# Patient Record
Sex: MALE | Race: WHITE | NOT HISPANIC OR LATINO | Employment: OTHER | URBAN - METROPOLITAN AREA
[De-identification: names, ages, dates, MRNs, and addresses within clinical notes are randomized per-mention and may not be internally consistent; named-entity substitution may affect disease eponyms.]

---

## 2018-05-24 ENCOUNTER — CONVERSION ENCOUNTER (OUTPATIENT)
Dept: NEUROLOGY | Facility: CLINIC | Age: 43
End: 2018-05-24

## 2019-08-08 ENCOUNTER — HOSPITAL ENCOUNTER (OUTPATIENT)
Dept: URGENT CARE | Facility: CLINIC | Age: 44
Discharge: HOME | End: 2019-08-08

## 2019-08-15 ENCOUNTER — OFFICE VISIT CONVERTED (OUTPATIENT)
Dept: ORTHOPEDIC SURGERY | Facility: CLINIC | Age: 44
End: 2019-08-15
Attending: ORTHOPAEDIC SURGERY

## 2019-08-15 ENCOUNTER — CONVERSION ENCOUNTER (OUTPATIENT)
Dept: ORTHOPEDIC SURGERY | Facility: CLINIC | Age: 44
End: 2019-08-15

## 2019-08-22 ENCOUNTER — HOSPITAL ENCOUNTER (OUTPATIENT)
Dept: MRI IMAGING | Facility: HOSPITAL | Age: 44
Discharge: HOME | End: 2019-08-22
Attending: ORTHOPAEDIC SURGERY

## 2019-09-03 ENCOUNTER — OFFICE VISIT CONVERTED (OUTPATIENT)
Dept: ORTHOPEDIC SURGERY | Facility: CLINIC | Age: 44
End: 2019-09-03
Attending: ORTHOPAEDIC SURGERY

## 2019-09-16 ENCOUNTER — HOSPITAL ENCOUNTER (OUTPATIENT)
Dept: PERIOP | Facility: HOSPITAL | Age: 44
Setting detail: HOSPITAL OUTPATIENT SURGERY
Discharge: HOME | End: 2019-09-16
Attending: ORTHOPAEDIC SURGERY

## 2019-09-19 ENCOUNTER — HOSPITAL ENCOUNTER (OUTPATIENT)
Dept: PHYSICAL THERAPY | Facility: CLINIC | Age: 44
Setting detail: RECURRING SERIES
Discharge: HOME | End: 2019-10-17
Attending: ORTHOPAEDIC SURGERY

## 2019-10-01 ENCOUNTER — OFFICE VISIT CONVERTED (OUTPATIENT)
Dept: ORTHOPEDIC SURGERY | Facility: CLINIC | Age: 44
End: 2019-10-01
Attending: ORTHOPAEDIC SURGERY

## 2019-10-17 ENCOUNTER — OFFICE VISIT CONVERTED (OUTPATIENT)
Dept: ORTHOPEDIC SURGERY | Facility: CLINIC | Age: 44
End: 2019-10-17
Attending: ORTHOPAEDIC SURGERY

## 2020-07-29 ENCOUNTER — HOSPITAL ENCOUNTER (OUTPATIENT)
Dept: URGENT CARE | Facility: CLINIC | Age: 45
Discharge: HOME | End: 2020-07-29
Attending: FAMILY MEDICINE

## 2020-09-12 ENCOUNTER — HOSPITAL ENCOUNTER (OUTPATIENT)
Dept: URGENT CARE | Facility: CLINIC | Age: 45
Discharge: HOME | End: 2020-09-12
Attending: EMERGENCY MEDICINE

## 2021-04-23 ENCOUNTER — HOSPITAL ENCOUNTER (OUTPATIENT)
Dept: GENERAL RADIOLOGY | Facility: HOSPITAL | Age: 46
Discharge: HOME | End: 2021-04-23

## 2021-04-28 ENCOUNTER — OFFICE VISIT CONVERTED (OUTPATIENT)
Dept: CARDIOLOGY | Facility: CLINIC | Age: 46
End: 2021-04-28
Attending: INTERNAL MEDICINE

## 2021-04-28 ENCOUNTER — CONVERSION ENCOUNTER (OUTPATIENT)
Dept: CARDIOLOGY | Facility: CLINIC | Age: 46
End: 2021-04-28

## 2021-05-14 VITALS
SYSTOLIC BLOOD PRESSURE: 136 MMHG | HEIGHT: 70 IN | HEART RATE: 84 BPM | DIASTOLIC BLOOD PRESSURE: 86 MMHG | WEIGHT: 188 LBS | BODY MASS INDEX: 26.92 KG/M2

## 2021-05-15 VITALS — HEART RATE: 96 BPM | OXYGEN SATURATION: 94 % | HEIGHT: 70 IN

## 2021-05-15 VITALS — HEART RATE: 85 BPM | HEIGHT: 70 IN | OXYGEN SATURATION: 92 %

## 2021-05-15 VITALS — OXYGEN SATURATION: 97 % | HEIGHT: 70 IN | HEART RATE: 95 BPM

## 2021-05-15 VITALS — HEIGHT: 70 IN | HEART RATE: 108 BPM | OXYGEN SATURATION: 94 %

## 2021-05-16 VITALS
SYSTOLIC BLOOD PRESSURE: 127 MMHG | HEIGHT: 70 IN | DIASTOLIC BLOOD PRESSURE: 87 MMHG | BODY MASS INDEX: 27.95 KG/M2 | WEIGHT: 195.25 LBS

## 2021-05-25 ENCOUNTER — TRANSCRIBE ORDERS (OUTPATIENT)
Dept: CARDIOLOGY | Facility: CLINIC | Age: 46
End: 2021-05-25

## 2021-05-25 DIAGNOSIS — R07.89 CHEST PAIN, ATYPICAL: Primary | ICD-10-CM

## 2021-06-16 ENCOUNTER — HOSPITAL ENCOUNTER (OUTPATIENT)
Dept: CT IMAGING | Facility: HOSPITAL | Age: 46
Discharge: HOME | End: 2021-06-16
Admitting: INTERNAL MEDICINE

## 2021-06-16 VITALS
RESPIRATION RATE: 16 BRPM | DIASTOLIC BLOOD PRESSURE: 81 MMHG | OXYGEN SATURATION: 98 % | HEIGHT: 70 IN | HEART RATE: 62 BPM | SYSTOLIC BLOOD PRESSURE: 128 MMHG | BODY MASS INDEX: 29.35 KG/M2 | WEIGHT: 205 LBS

## 2021-06-16 DIAGNOSIS — R07.89 CHEST PAIN, ATYPICAL: ICD-10-CM

## 2021-06-16 LAB
CREAT BLDA-MCNC: 1.3 MG/DL (ref 0.6–1.3)
QT INTERVAL: 399 MS

## 2021-06-16 PROCEDURE — 0 IOPAMIDOL PER 1 ML: Performed by: INTERNAL MEDICINE

## 2021-06-16 PROCEDURE — 93010 ELECTROCARDIOGRAM REPORT: CPT | Performed by: INTERNAL MEDICINE

## 2021-06-16 PROCEDURE — 82565 ASSAY OF CREATININE: CPT

## 2021-06-16 PROCEDURE — 93005 ELECTROCARDIOGRAM TRACING: CPT | Performed by: INTERNAL MEDICINE

## 2021-06-16 PROCEDURE — 75574 CT ANGIO HRT W/3D IMAGE: CPT

## 2021-06-16 PROCEDURE — 75574 CT ANGIO HRT W/3D IMAGE: CPT | Performed by: INTERNAL MEDICINE

## 2021-06-16 RX ORDER — IBUPROFEN 200 MG
400 TABLET ORAL EVERY 6 HOURS PRN
COMMUNITY

## 2021-06-16 RX ORDER — LISINOPRIL 10 MG/1
10 TABLET ORAL DAILY
COMMUNITY

## 2021-06-16 RX ADMIN — IOPAMIDOL 100 ML: 755 INJECTION, SOLUTION INTRAVENOUS at 14:44

## 2021-06-16 NOTE — NURSING NOTE
Reviewed with patient the D/C instructions to drink plenty of fluids today and tomorrow .  He voices understanding and is able to teach back D/C instructions.

## 2021-06-16 NOTE — NURSING NOTE
Patient is in x ray triage for CTA angiogram heart with 3 d image.  Patiemt is wearing a mask and RN is wearing a mask and goggles with each patient encounter.

## 2021-06-16 NOTE — NURSING NOTE
Fransisca from [facility] called and said that Dr. Arreola would be reading this scan.CT notified.

## 2021-06-16 NOTE — DISCHARGE INSTRUCTIONS
Drink plenty of fluids today and tomorrow to protect your kidneys.  The contrast is eliminated through the kidneys.

## 2021-06-17 ENCOUNTER — TELEPHONE (OUTPATIENT)
Dept: CARDIOLOGY | Facility: CLINIC | Age: 46
End: 2021-06-17

## 2021-06-17 ENCOUNTER — DOCUMENTATION (OUTPATIENT)
Dept: CARDIOLOGY | Facility: CLINIC | Age: 46
End: 2021-06-17

## 2021-06-17 NOTE — TELEPHONE ENCOUNTER
----- Message from MISTI Montez sent at 6/17/2021  1:36 PM EDT -----  Notify patient normal CTA heart, score is 0.  No follow up needed unless patient wants to be seen yearly.

## 2021-06-17 NOTE — PROGRESS NOTES
Cardiac CTA with morphology  6/16/21  Reason for the exam: chest pain    Calcium score is 0 Agatston units.  This is between the 1-25 percentile for men between the ages of 40-44 years old.    Heart rate 59 bpm.  Left ventricular end-diastolic volume 142 mL.  Left ventricular end-systolic volume 69 mL.  Ejection fraction 51%.  Stroke volume 73 mL.  Cardiac output 4.3 L/m    The right atrium is normal in size.  The right ventricle is normal in size.  There is grossly normal right ventricular systolic function.  The pulmonary artery is normal in size.  There are 4 pulmonary veins which enter the left atrium in their expected location.  The left atrial appendage was visualized and is without thrombus.  The intra-atrial septum appeared to be intact.  The left ventricle is normal in size with normal systolic function.  There was no evidence of a left ventricular thrombus.  The intraventricular septum appeared to be intact.  The mitral valve appeared structurally normal.  The aortic valve was trileaflet and appears structurally and functionally normal.  The pulmonic valve appeared structurally normal.  The tricuspid valve appeared structurally normal.  There was no pericardial effusion.  The pericardium appeared normal.    The left main coronary artery came off the left coronary cusp in its anticipated location.  It bifurcated into the left anterior descending artery and the circumflex coronary artery.  There was no evidence of atherosclerotic disease of the left main coronary artery.  Left anterior descending artery wraps around the apex of the heart.  There is no evidence of atherosclerotic disease.  The circumflex artery is the nondominant vessel with no evidence of atherosclerotic disease.  The right coronary artery is the dominant vessel with no evidence of atherosclerotic disease.    Conclusions:  1.  Calcium score is 0 Agatston units.  This is between the 1-25 percentile for men between the ages of 40-44 years  old.  2.  Structurally normal heart.  3.  No evidence of coronary artery disease.    Serena Arreola MD  06/17/21   74

## 2021-07-02 ENCOUNTER — APPOINTMENT (OUTPATIENT)
Dept: GENERAL RADIOLOGY | Facility: HOSPITAL | Age: 46
End: 2021-07-02

## 2021-07-02 VITALS
WEIGHT: 194.89 LBS | DIASTOLIC BLOOD PRESSURE: 79 MMHG | OXYGEN SATURATION: 97 % | TEMPERATURE: 98.2 F | BODY MASS INDEX: 27.9 KG/M2 | RESPIRATION RATE: 18 BRPM | HEIGHT: 70 IN | HEART RATE: 92 BPM | SYSTOLIC BLOOD PRESSURE: 160 MMHG

## 2021-07-02 PROCEDURE — 99282 EMERGENCY DEPT VISIT SF MDM: CPT

## 2021-07-02 PROCEDURE — 73130 X-RAY EXAM OF HAND: CPT

## 2021-07-03 ENCOUNTER — HOSPITAL ENCOUNTER (EMERGENCY)
Facility: HOSPITAL | Age: 46
Discharge: HOME | End: 2021-07-03
Attending: EMERGENCY MEDICINE | Admitting: EMERGENCY MEDICINE

## 2021-07-03 DIAGNOSIS — S61.210A LACERATION OF RIGHT INDEX FINGER WITHOUT FOREIGN BODY WITHOUT DAMAGE TO NAIL, INITIAL ENCOUNTER: Primary | ICD-10-CM

## 2021-07-03 RX ORDER — LIDOCAINE HYDROCHLORIDE 10 MG/ML
10 INJECTION, SOLUTION EPIDURAL; INFILTRATION; INTRACAUDAL; PERINEURAL ONCE
Status: COMPLETED | OUTPATIENT
Start: 2021-07-03 | End: 2021-07-03

## 2021-07-03 RX ORDER — LIDOCAINE HYDROCHLORIDE AND EPINEPHRINE 10; 10 MG/ML; UG/ML
10 INJECTION, SOLUTION INFILTRATION; PERINEURAL ONCE
Status: DISCONTINUED | OUTPATIENT
Start: 2021-07-03 | End: 2021-07-03

## 2021-07-03 RX ADMIN — LIDOCAINE HYDROCHLORIDE 10 ML: 10 INJECTION, SOLUTION EPIDURAL; INFILTRATION; INTRACAUDAL; PERINEURAL at 01:20

## 2021-07-03 NOTE — ED PROVIDER NOTES
Subjective   Patient states he was changing a tire when he cut his right index finger on an unknown object      History provided by:  Patient   used: No    Finger Laceration  Location:  Left index  Quality:  Throbbing  Severity:  Mild  Onset quality:  Sudden  Timing:  Constant  Progression:  Improving  Chronicity:  New  Associated symptoms: no abdominal pain, no chest pain, no congestion, no cough, no diarrhea, no ear pain, no fever, no headaches, no nausea, no shortness of breath, no sore throat and no vomiting        Review of Systems   Constitutional: Negative for chills and fever.   HENT: Negative for congestion, ear pain and sore throat.    Eyes: Negative for pain.   Respiratory: Negative for cough, chest tightness and shortness of breath.    Cardiovascular: Negative for chest pain.   Gastrointestinal: Negative for abdominal pain, diarrhea, nausea and vomiting.   Genitourinary: Negative for flank pain and hematuria.   Musculoskeletal: Negative for joint swelling.   Skin: Positive for wound. Negative for pallor.   Neurological: Negative for seizures and headaches.   All other systems reviewed and are negative.      Past Medical History:   Diagnosis Date   • Arthritis    • Chest pain    • GERD (gastroesophageal reflux disease)    • Hypertension        Allergies   Allergen Reactions   • Other Swelling     oranges   • Amoxicillin Unknown - Low Severity       Past Surgical History:   Procedure Laterality Date   • CHOLECYSTECTOMY     • KNEE ARTHROSCOPY      right knee scope and left torn meniscus       History reviewed. No pertinent family history.    Social History     Socioeconomic History   • Marital status: Single     Spouse name: Not on file   • Number of children: Not on file   • Years of education: Not on file   • Highest education level: Not on file   Tobacco Use   • Smoking status: Never Smoker   • Smokeless tobacco: Current User     Types: Chew   Vaping Use   • Vaping Use: Never used    Substance and Sexual Activity   • Drug use: Never           Objective   Physical Exam  Vitals and nursing note reviewed.   Constitutional:       General: He is not in acute distress.     Appearance: Normal appearance. He is not toxic-appearing.   Cardiovascular:      Rate and Rhythm: Normal rate and regular rhythm.      Pulses: Normal pulses.      Heart sounds: Normal heart sounds.   Pulmonary:      Effort: Pulmonary effort is normal. No respiratory distress.      Breath sounds: Normal breath sounds.   Abdominal:      Tenderness: There is no abdominal tenderness.   Musculoskeletal:         General: Normal range of motion.      Cervical back: Normal range of motion and neck supple.   Skin:     General: Skin is warm and dry.             Comments: 1.5 cm curved flap laceration on right index finger   Neurological:      Mental Status: He is alert and oriented to person, place, and time. Mental status is at baseline.         Laceration Repair    Date/Time: 7/3/2021 1:35 AM  Performed by: Radha Velasco APRN  Authorized by: Damaso Miller MD     Consent:     Consent obtained:  Verbal    Consent given by:  Patient    Risks discussed:  Infection, need for additional repair, pain, poor cosmetic result and poor wound healing    Alternatives discussed:  No treatment, observation and delayed treatment  Anesthesia (see MAR for exact dosages):     Anesthesia method:  Local infiltration    Local anesthetic:  Lidocaine 1% w/o epi  Laceration details:     Location:  Finger    Length (cm):  1.5    Depth (mm):  2  Repair type:     Repair type:  Simple  Exploration:     Hemostasis achieved with:  Direct pressure    Wound exploration: entire depth of wound probed and visualized      Contaminated: no    Treatment:     Area cleansed with:  Saline    Amount of cleaning:  Standard  Skin repair:     Repair method:  Sutures    Suture size:  5-0    Suture material:  Nylon    Suture technique:  Simple interrupted    Number of sutures:   2  Approximation:     Approximation:  Close  Post-procedure details:     Dressing:  Adhesive bandage    Patient tolerance of procedure:  Tolerated well, no immediate complications               ED Course                                           MDM  Number of Diagnoses or Management Options     Amount and/or Complexity of Data Reviewed  Tests in the radiology section of CPT®: reviewed        Final diagnoses:   Laceration of right index finger without foreign body without damage to nail, initial encounter       ED Disposition  ED Disposition     ED Disposition Condition Comment    Discharge Stable           Provider, No Known  [place] [id number]  [phone]    In 1 week  For suture removal         Medication List      No changes were made to your prescriptions during this visit.          Radha Velasco, APRN  07/03/21 0142

## 2021-07-03 NOTE — ED TRIAGE NOTES
Pt to ED 14 with c/o R index finger lac. Pt reports was changing a tire and sustained lac on part of the tire. Pt reports last tetanus was 2017. Pt presents a/ox4, gcs 15, bleeding controlled, approx 2cm crescent shaped lac to pad of index finger.

## 2021-07-03 NOTE — DISCHARGE INSTRUCTIONS
Keep the wound clean and dry.  Protect it from reinjury or contamination to avoid complications such as infection.  Monitor for signs of infection to include increased warmth, increased pain, or pus draining from the wound.  You may take Tylenol or Motrin over-the-counter for your pain.

## 2022-03-17 ENCOUNTER — TELEPHONE (OUTPATIENT)
Dept: NEUROLOGY | Facility: OTHER | Age: 47
End: 2022-03-17

## 2022-03-17 NOTE — TELEPHONE ENCOUNTER
PT CALLED TO SEE ABOUT GETTING SEEN FOR MIGRAINES. PT LAST SAW DR COLON IN 2017.    PT HAD CT SCAN DONE IN 2017 AT [facility]. PT HASN'T HAD ANY OTHER IMAGING DONE ON THE HEAD.    2021 PT WAS SEEN AT [facility] IN ER FOR MIGRAINE.    GAVE PT HUB FAX NUMBER.

## 2022-12-29 NOTE — PROGRESS NOTES
"   Progress Note      Patient Name: Gibran Andrade   Patient ID: [id number]   Sex: Male   YOB: 1975    Primary Care Provider: Urvashi THOMASON   Referring Provider: Damaso Haro MD    Visit Date: April 28, 2021    Provider: Antoine Kaur MD   Location: [facility]   Location Address: [address] [place] [address], [facility]   [place]  [id number]   Location Phone: [phone]          Chief Complaint     Chest pain.       History Of Present Illness  REFERRING CARE PROVIDER: Damaso Haro MD   Gibran Andrade is a 45 year old /White male with a history of hypertension, who has had chest discomfort for about the last six weeks. He was seen in the Emergency Room and ruled out for an acute myocardial infarction. He underwent stress testing at that time which, imaging-wise, showed no ischemic changes, but he has had persistent chest discomfort described as constant, stabbing in intensity, worsening at times, waxing and waning in intensity, with some left arm tingling. No other associated symptoms. He does report some shortness of breath with exertion issues as well.   PAST MEDICAL HISTORY: Chest pain; Migraine headache, Lower extremity nerve injury.   PSYCHOSOCIAL HISTORY: Rarely consumes alcohol. Previously smoked, but quit.   CURRENT MEDICATIONS: Medications have been reviewed and are as stated.      ALLERGIES:  No known drug allergies.       Review of Systems  · Cardiovascular  o Admits  o : palpitations (fast, fluttering, or skipping beats), swelling (feet, ankles, hands), shortness of breath while walking or lying flat, chest pain or angina pectoris   · Respiratory  o Denies  o : chronic or frequent cough      Vitals  Date Time BP Position Site L\R Cuff Size HR RR TEMP (F) WT  HT  BMI kg/m2 BSA m2 O2 Sat FR L/min FiO2 HC       04/28/2021 01:05 /86 Sitting    84 - R   188lbs 0oz 5'  10\" 26.97 2.05             Physical Examination  · Constitutional  o Appearance  o : Awake, alert, " in no acute distress.   · Eyes  o Conjunctivae  o : Normal.  · Ears, Nose, Mouth and Throat  o Oral Cavity  o :   § Oral Mucosa  § : Normal.  · Neck  o Inspection/Palpation  o : No JVD. Good carotid upstroke. No thyromegaly.  · Respiratory  o Respiratory  o : Good respiratory effort. Clear to percussion and auscultation.  · Cardiovascular  o Heart  o :   § Auscultation of Heart  § : S1, S2 normal. Regular rate and rhythm without murmurs, gallops, or rubs. There was no significant reproducible chest pain to palpation.  o Peripheral Vascular System  o :   § Extremities  § : Good femoral and pedal pulses. No pedal edema.  · Gastrointestinal  o Abdominal Examination  o : Soft. No tenderness or masses felt. No hepatosplenomegaly. Abdominal aorta is not palpable.  · Imaging  o Imaging  o : He had a CT scan of the chest done for PE, which showed no pulmonary emboli, but did show some perfusion abnormalities in the mid and lower lungs consistent with small airway or vessel disease.   · EKG  o Results  o : EKG shows normal sinus rhythm with borderline ST elevations in the lateral leads.   · Diagnostic Testing  o Diagnostic Testing  o : His previous exercise treadmill stress test did have some chest pain both at rest and with exertion, but there were no perfusions suggestive for ischemia.           Assessment     ASSESSMENT AND PLAN:   Chest pain, still recurrent in nature.  Given the duration of his symptoms as being constant and no significant exertional component, I have a low suspicion for cardiac etiology, especially given his normal stress test.  It could be musculoskeletal.  The pain does seem to be persisting longer than would be expected.  Recommended a trial of Protonix 40 mg once a day.  Also, will attempt to set him up for a CTA of his chest to evaluate for coronary atherosclerosis further.    MD MILADYS Stevenson/cielo               Electronically Signed by: Cami Arredondo-, Other -Author on May 3,  2021 07:19:14 AM  Electronically Co-signed by: Antoine Kaur MD -Reviewer on May 5, 2021 05:32:58 AM   Cibinqo Pregnancy And Lactation Text: It is unknown if this medication will adversely affect pregnancy or breast feeding.  You should not take this medication if you are currently pregnant or planning a pregnancy or while breastfeeding.

## 2023-10-11 NOTE — NURSING NOTE
Patient ambulated to the main entrance without problems or concerns.  He is meeting his son in the lobby.   I have personally seen and examined the patient. I have collaborated with and supervised the